# Patient Record
Sex: MALE | Race: WHITE | NOT HISPANIC OR LATINO | Employment: STUDENT | URBAN - METROPOLITAN AREA
[De-identification: names, ages, dates, MRNs, and addresses within clinical notes are randomized per-mention and may not be internally consistent; named-entity substitution may affect disease eponyms.]

---
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
## 2020-12-03 ENCOUNTER — OFFICE VISIT - HEALTHEAST (OUTPATIENT)
Dept: FAMILY MEDICINE | Facility: CLINIC | Age: 20
End: 2020-12-03

## 2020-12-03 ENCOUNTER — COMMUNICATION - HEALTHEAST (OUTPATIENT)
Dept: TELEHEALTH | Facility: CLINIC | Age: 20
End: 2020-12-03

## 2020-12-03 DIAGNOSIS — Z71.89 COUNSELING ON HEALTH CARE DIRECTIVE: ICD-10-CM

## 2020-12-03 DIAGNOSIS — Z00.00 ANNUAL PHYSICAL EXAM: ICD-10-CM

## 2020-12-03 DIAGNOSIS — K40.90 UNILATERAL INGUINAL HERNIA WITHOUT OBSTRUCTION OR GANGRENE, RECURRENCE NOT SPECIFIED: ICD-10-CM

## 2020-12-03 ASSESSMENT — MIFFLIN-ST. JEOR: SCORE: 2071.63

## 2021-01-04 ENCOUNTER — COMMUNICATION - HEALTHEAST (OUTPATIENT)
Dept: SURGERY | Facility: CLINIC | Age: 21
End: 2021-01-04

## 2021-01-04 ENCOUNTER — SURGERY - HEALTHEAST (OUTPATIENT)
Dept: SURGERY | Facility: CLINIC | Age: 21
End: 2021-01-04

## 2021-01-04 ENCOUNTER — OFFICE VISIT - HEALTHEAST (OUTPATIENT)
Dept: SURGERY | Facility: CLINIC | Age: 21
End: 2021-01-04

## 2021-01-04 DIAGNOSIS — K40.90 UNILATERAL INGUINAL HERNIA WITHOUT OBSTRUCTION OR GANGRENE, RECURRENCE NOT SPECIFIED: ICD-10-CM

## 2021-01-04 DIAGNOSIS — K40.91 UNILATERAL RECURRENT INGUINAL HERNIA WITHOUT OBSTRUCTION OR GANGRENE: ICD-10-CM

## 2021-01-04 ASSESSMENT — MIFFLIN-ST. JEOR: SCORE: 2058.02

## 2021-01-06 ENCOUNTER — AMBULATORY - HEALTHEAST (OUTPATIENT)
Dept: SURGERY | Facility: AMBULATORY SURGERY CENTER | Age: 21
End: 2021-01-06

## 2021-01-06 DIAGNOSIS — Z11.59 ENCOUNTER FOR SCREENING FOR OTHER VIRAL DISEASES: ICD-10-CM

## 2021-01-08 ASSESSMENT — MIFFLIN-ST. JEOR: SCORE: 2054.05

## 2021-01-09 ENCOUNTER — AMBULATORY - HEALTHEAST (OUTPATIENT)
Dept: FAMILY MEDICINE | Facility: CLINIC | Age: 21
End: 2021-01-09

## 2021-01-09 DIAGNOSIS — Z11.59 ENCOUNTER FOR SCREENING FOR OTHER VIRAL DISEASES: ICD-10-CM

## 2021-01-10 ENCOUNTER — COMMUNICATION - HEALTHEAST (OUTPATIENT)
Dept: FAMILY MEDICINE | Facility: CLINIC | Age: 21
End: 2021-01-10

## 2021-01-10 LAB
SARS-COV-2 PCR COMMENT: ABNORMAL
SARS-COV-2 RNA SPEC QL NAA+PROBE: POSITIVE
SARS-COV-2 VIRUS SPECIMEN SOURCE: ABNORMAL

## 2021-01-11 ENCOUNTER — COMMUNICATION - HEALTHEAST (OUTPATIENT)
Dept: SURGERY | Facility: CLINIC | Age: 21
End: 2021-01-11

## 2021-01-11 ENCOUNTER — ANESTHESIA - HEALTHEAST (OUTPATIENT)
Dept: SURGERY | Facility: AMBULATORY SURGERY CENTER | Age: 21
End: 2021-01-11

## 2021-01-13 ENCOUNTER — COMMUNICATION - HEALTHEAST (OUTPATIENT)
Dept: SURGERY | Facility: CLINIC | Age: 21
End: 2021-01-13

## 2021-01-13 ENCOUNTER — COMMUNICATION - HEALTHEAST (OUTPATIENT)
Dept: ADMINISTRATIVE | Facility: CLINIC | Age: 21
End: 2021-01-13

## 2021-01-25 ASSESSMENT — MIFFLIN-ST. JEOR: SCORE: 2054.05

## 2021-01-26 ENCOUNTER — AMBULATORY - HEALTHEAST (OUTPATIENT)
Dept: SURGERY | Facility: CLINIC | Age: 21
End: 2021-01-26

## 2021-01-26 ENCOUNTER — SURGERY - HEALTHEAST (OUTPATIENT)
Dept: SURGERY | Facility: AMBULATORY SURGERY CENTER | Age: 21
End: 2021-01-26

## 2021-01-26 DIAGNOSIS — Z98.890 POSTOPERATIVE STATE: ICD-10-CM

## 2021-01-26 RX ORDER — DOCUSATE SODIUM 100 MG/1
100 CAPSULE, LIQUID FILLED ORAL 2 TIMES DAILY
Refills: 0 | Status: SHIPPED | COMMUNITY
Start: 2021-01-26 | End: 2022-03-11

## 2021-02-09 ENCOUNTER — OFFICE VISIT - HEALTHEAST (OUTPATIENT)
Dept: SURGERY | Facility: CLINIC | Age: 21
End: 2021-02-09

## 2021-02-09 DIAGNOSIS — Z48.89 POSTOPERATIVE VISIT: ICD-10-CM

## 2021-06-05 VITALS
BODY MASS INDEX: 25.94 KG/M2 | HEIGHT: 76 IN | DIASTOLIC BLOOD PRESSURE: 64 MMHG | OXYGEN SATURATION: 100 % | HEART RATE: 72 BPM | SYSTOLIC BLOOD PRESSURE: 116 MMHG | WEIGHT: 213 LBS

## 2021-06-05 VITALS
HEIGHT: 76 IN | WEIGHT: 210 LBS | DIASTOLIC BLOOD PRESSURE: 78 MMHG | SYSTOLIC BLOOD PRESSURE: 118 MMHG | BODY MASS INDEX: 25.57 KG/M2 | HEART RATE: 66 BPM

## 2021-06-05 VITALS — WEIGHT: 210 LBS | BODY MASS INDEX: 25.57 KG/M2 | HEIGHT: 76 IN

## 2021-06-13 NOTE — PROGRESS NOTES
Assessment:      Healthy male exam.      Plan:      1. Annual physical exam     2. Unilateral inguinal hernia without obstruction or gangrene, recurrence not specified  Ambulatory referral to General Surgery   3. Counseling on health care directive       Counseled on advanced healthcare directives.  Declines completing HPV vaccine series.  Bulge certainly looks like a hernia.  General surgery referral placed to discuss definitive treatment.    Subjective:      Hesham Malcolm is a 20 y.o. male who presents for an annual exam. The patient reports that there is not domestic violence in his life.     Overall patient is doing well.  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  He has noticed a lump in his pelvic/groin area particularly with exercise.  It is becoming more persistent and bothersome.  No urinary symptoms.  No GI issues.  No pain at rest.  Nothing like this is ever happened before.  Afebrile without chills.  No testicular pain.       Healthy Habits:   Regular Exercise: Yes  Sunscreen Use: Yes  Healthy Diet: Yes  Dental Visits Regularly: Yes  Seat Belt: Yes  Sexually active: No  Monthly Self Testicular Exams:  discussed  Hemoccults: N/A  Flex Sig: N/A  Colonoscopy: N/A  Lipid Profile: N/A  Glucose Screen: No  Prevention of Osteoporosis: N/A  Last Dexa: N/A      Immunization History   Administered Date(s) Administered     Dtap 2000, 2000, 2000, 06/13/2000, 08/04/2004     HPV Quadrivalent 07/16/2015     Hep B, Peds or Adolescent 1999, 2000, 1999, 1999     Hepatitis A, Ped/Adol 2 Dose IM (18yr & under) 07/16/2015     Hib (PRP-OMP) 1999, 1999, 1999     Hib (PRP-T) 06/13/2000     INFLUENZA,SEASONAL QUAD, PF, =/> 6months 12/03/2020     IPV 08/04/2004     MMR 06/13/2000, 08/04/2004     Meningococcal MCV4P 06/20/2011     OPV,Trivalent,Historic(3621-5748 only) 1999, 1999, 1999     Tdap  06/20/2011     Varicella 08/04/2004, 07/16/2015     Immunization status: Refuses Immunization.    No exam data present    No current outpatient medications on file.     No current facility-administered medications for this visit.      No past medical history on file.  Past Surgical History:   Procedure Laterality Date     SHOULDER ARTHROSCOPY W/ LABRAL REPAIR Left      WISDOM TOOTH EXTRACTION       Patient has no known allergies.  Family History   Problem Relation Age of Onset     Heart disease Father      No Medical Problems Sister      No Medical Problems Brother      No Medical Problems Sister      No Medical Problems Sister      Diabetes type I Sister      No Medical Problems Brother      Social History     Socioeconomic History     Marital status: Single     Spouse name: Not on file     Number of children: Not on file     Years of education: Not on file     Highest education level: Not on file   Occupational History     Not on file   Social Needs     Financial resource strain: Not on file     Food insecurity     Worry: Not on file     Inability: Not on file     Transportation needs     Medical: Not on file     Non-medical: Not on file   Tobacco Use     Smoking status: Never Smoker     Smokeless tobacco: Never Used   Substance and Sexual Activity     Alcohol use: Yes     Frequency: 2-4 times a month     Drug use: Never     Sexual activity: Not on file   Lifestyle     Physical activity     Days per week: Not on file     Minutes per session: Not on file     Stress: Not on file   Relationships     Social connections     Talks on phone: Not on file     Gets together: Not on file     Attends Taoism service: Not on file     Active member of club or organization: Not on file     Attends meetings of clubs or organizations: Not on file     Relationship status: Not on file     Intimate partner violence     Fear of current or ex partner: Not on file     Emotionally abused: Not on file     Physically abused: Not on file  "    Forced sexual activity: Not on file   Other Topics Concern     Not on file   Social History Narrative     Not on file       Review of Systems  General:  Denies problem  Eyes: Denies problem  Ears/Nose/Throat: Denies problem  Cardiovascular: Denies problem  Respiratory:  Denies problem  Gastrointestinal:  Bulge  Genitourinary: Denies problem  Musculoskeletal:  Denies problem  Skin: Denies problem  Neurologic: Denies problem  Psychiatric: Denies problem  Endocrine: Denies problem  Heme/Lymphatic: Denies problem   Allergic/Immunologic: Denies problem        Objective:     Vitals:    12/03/20 0853   BP: 116/64   Pulse: 72   SpO2: 100%   Weight: 213 lb (96.6 kg)   Height: 6' 4.25\" (1.937 m)     Body mass index is 25.76 kg/m .    Physical  General Appearance: Alert, cooperative, no distress, appears stated age  Head: Normocephalic, without obvious abnormality, atraumatic  Eyes: PERRL, conjunctiva/corneas clear, EOM's intact  Ears: Normal TM's and external ear canals, both ears  Nose: Nares normal, septum midline,mucosa normal, no drainage  Throat: Lips, mucosa, and tongue normal; teeth and gums normal  Neck: Supple, symmetrical, trachea midline, no adenopathy;  thyroid: not enlarged, symmetric, no tenderness/mass/nodules; no carotid bruit or JVD  Back: Symmetric, no curvature, ROM normal, no CVA tenderness  Lungs: Clear to auscultation bilaterally, respirations unlabored  Heart: Regular rate and rhythm, S1 and S2 normal, no murmur, rub, or gallop,  Abdomen: Soft, non-tender, bowel sounds active all four quadrants,  no masses, no organomegaly  Genitourinary: Penis normal. Right testis is descended. Left testis is descended.  Along the inguinal area there is a moderate sized bulge.  Reducible.   Musculoskeletal: Normal range of motion. No joint swelling or deformity.   Extremities: Extremities normal, atraumatic, no cyanosis or edema  Skin: Skin color, texture, turgor normal, no rashes or lesions  Lymph nodes: Cervical, " supraclavicular, and axillary nodes normal  Neurologic: He is alert. He has normal reflexes.   Psychiatric: He has a normal mood and affect.      Deon Ariza, CNP

## 2021-06-14 NOTE — TELEPHONE ENCOUNTER
Reason for Call: Lab Order for Anitbody testing    Order or referral being requested: Lab Order    Date needed: as soon as possible    Has the patient been seen by the PCP for this problem? YES and NO    Additional comments: Pt would like do to a covid antibiody lab test. Pt was tested positive on 1/9/2021 with [facility], he had another test done couple days later and that test came back negative that's why he wants to get this lab test done. Can you order lab for him?     Phone number Patient can be reached at:  Home number on file [phone] (home)    Best Time:  anytime    Can we leave a detailed message on this number?  Yes    Call taken on 1/13/2021 at 10:44 AM by Roxy White

## 2021-06-14 NOTE — ANESTHESIA CARE TRANSFER NOTE
Last vitals:   Vitals:    01/26/21 1014   BP: 126/56   Pulse: 76   Resp: 16   Temp: 36.3  C (97.4  F)   SpO2: 99%     Pt brought to PACU on 10L facemask. Monitors applied. VSS upon arrival.    Patient's level of consciousness is drowsy  Spontaneous respirations: yes  Maintains airway independently: yes  Dentition unchanged: yes  Oropharynx: oropharynx clear of all foreign objects    QCDR Measures:  ASA# 20 - Surgical Safety Checklist: WHO surgical safety checklist completed prior to induction    PQRS# 430 - Adult PONV Prevention: 4558F - Pt received => 2 anti-emetic agents (different classes) preop & intraop  ASA# 8 - Peds PONV Prevention: NA - Not pediatric patient, not GA or 2 or more risk factors NOT present  PQRS# 424 - Palak-op Temp Management: 4559F - At least one body temp DOCUMENTED => 35.5C or 95.9F within required timeframe  PQRS# 426 - PACU Transfer Protocol: - Transfer of care checklist used  ASA# 14 - Acute Post-op Pain: ASA14B - Patient did NOT experience pain >= 7 out of 10

## 2021-06-14 NOTE — TELEPHONE ENCOUNTER
Spoke with Hesham today to reschedule his surgery scheduled for tomorrow due to his positive covid test on 1/9/21. Hesham mentioned that he got another test done yesterday hoping that it is negative and asked if he could keep his surgery on if the second one was negative. Informed Hesham that unfortunately, we have to wait 10 days from a positive covid test. Also mentioned that he will not need to do another covid test within 90 days since it may show positive after a couple months. Hesham verbalized understanding and would like to reschedule to Dr. Peterson's next available date of jan. 29th 2021. New check in time is approximately 8:00 AM. Hesham is aware of surgery instructions.    Carmella RAMIREZ  Surgery Scheduler  [facility]  Surgery Clinic [facility]  Direct line: [phone]   Main Line: [phone]  Direct Fax: [phone]

## 2021-06-14 NOTE — ANESTHESIA PREPROCEDURE EVALUATION
Anesthesia Evaluation      Patient summary reviewed   History of anesthetic complications: ponv.     Airway   Mallampati: II   Pulmonary - normal exam     ROS comment: COVID positive 1/9/2021                         Cardiovascular - negative ROS and normal exam  Rhythm: regular  Rate: normal,         Neuro/Psych - negative ROS     Endo/Other - negative ROS      GI/Hepatic/Renal - negative ROS      Other findings: Results for CATHERINE VAN (MRN [id number]) as of 1/26/2021 06:34    1/9/2021 09:58  SARS-CoV-2 Virus Specimen Source: Nasopharyngeal  SARS-CoV-2 PCR Result: POSITIVE (!!)        Dental - normal exam                        Anesthesia Plan  Planned anesthetic: general endotracheal  GAETT  Antiemetics  Soft bite block  Tylenol po pre op  toradol at the end of the case if okay with the surgeon      ASA 1   Induction: intravenous   Anesthetic plan and risks discussed with: patient    Post-op plan: routine recovery

## 2021-06-14 NOTE — TELEPHONE ENCOUNTER
Spoke with Hesham today and went over surgery details:      Surgery Date: Tuesday January 12th     Location: [facility] [address] (3rd Floor)                  [address] [place] [address]     Arrival Time: 8:15 AM (unless instructed otherwise by the pre-op nurse)    Prep Instructions:     1. Dr. Peterson will do your pre-op the same day of surgery.    2. COVID19 testing is required within 4 days of surgery. [facility] [address] [place], [place] on Sat. Miguel 9th at 9:50 AM. Follow the specific instructions you receive by Rodger. If your test is positive, your surgery will be canceled.     3. Nothing to eat or drink for 8 hours before surgery unless instructed differently by the pre-op nurse.    4. No blood thinners including aspirin for one week prior to surgery. Verify this is safe for you with your primary care doctor before stopping.     5. You need an adult to drive you home and stay with you 24 hours after surgery.     6. No visitors are allowed at the facility or in the building. Family/Friends who accompany the patient will need to wait in their vehicle or return home to be called when patient is ready for .    7. When you arrive to the surgery center, you will be screened for COVID19 symptoms. If you screen positive, your surgery will need to be postponed for your safety.    8. If the community sees a new surge in COVID19 hospital admissions, your procedure may need to be postponed. We will contact you if this happens.    9. We always encourage you to notify your insurance any time you have something scheduled including surgery. The number is usually right on the back of your insurance card. Please call [facility] Cost of Care at [phone] for the surgeon fees, and [facility] Cost of Care [phone] for facility fees if you  need pricing information.     Call our office if you have any questions! Thank you!     Carmella RAMIREZ  Surgery Scheduler  [facility]  Surgery [facility]  Direct line: [phone]   Main Line: [phone]  Direct Fax: [phone]

## 2021-06-14 NOTE — ANESTHESIA POSTPROCEDURE EVALUATION
Patient: Hesham Malcolm  Procedure(s):  HERNIORRHAPHY, INGUINAL, LAPAROSCOPIC WITH MESH (Right)  Anesthesia type: general    Patient location: Phase II Recovery  Last vitals:   Vitals Value Taken Time   /56 01/26/21 1145   Temp 36.3  C (97.4  F) 01/26/21 1120   Pulse 67 01/26/21 1154   Resp 16 01/26/21 1145   SpO2 99 % 01/26/21 1154   Vitals shown include unvalidated device data.  Post vital signs: stable  Level of consciousness: awake and responds to simple questions  Post-anesthesia pain: pain controlled  Post-anesthesia nausea and vomiting: no  Pulmonary: unassisted, return to baseline  Cardiovascular: stable and blood pressure at baseline  Hydration: adequate  Anesthetic events: no    QCDR Measures:  ASA# 11 - Palak-op Cardiac Arrest: ASA11B - Patient did NOT experience unanticipated cardiac arrest  ASA# 12 - Palak-op Mortality Rate: ASA12B - Patient did NOT die  ASA# 13 - PACU Re-Intubation Rate: ASA13B - Patient did NOT require a new airway mgmt  ASA# 10 - Composite Anes Safety: ASA10A - No serious adverse event    Additional Notes:

## 2021-06-14 NOTE — PROGRESS NOTES
"HPI:  Hesham Malcolm is a 20 y.o. male who was referred to me by Provider, [facility] for an inguinal hernia. He  presents today with complaints of a right inguinal bulge for several months.  He notices the bulge is worse after doing pull-ups.  He is fairly active and states that is progressively enlarging and causing discomfort with his activities.  Denies any fevers, chills, nausea or vomiting.    Allergies:Patient has no known allergies.    No past medical history on file.    Past Surgical History:   Procedure Laterality Date     SHOULDER ARTHROSCOPY W/ LABRAL REPAIR Left      WISDOM TOOTH EXTRACTION         CURRENT MEDS:  No current outpatient medications on file.     No current facility-administered medications for this visit.        Family history-reviewed and noncontributory to the current visit     reports that he has never smoked. He has never used smokeless tobacco. He reports current alcohol use. He reports that he does not use drugs.    Review of Systems -   The 12 system review is within normal limits except for as mentioned above.  General ROS: No complaints or constitutional symptoms  Ophthalmic ROS: No complaints of visual changes  Skin: No complaints or symptoms   Endocrine: No complaints or symptoms  Hematologic/Lymphatic: No symptoms or complaints  Psychiatric: No symptoms or complaints  Respiratory ROS: no cough, shortness of breath, or wheezing  Cardiovascular ROS: no chest pain or dyspnea on exertion  Gastrointestinal ROS: As per HPI  Genito-Urinary ROS: no dysuria, trouble voiding, or hematuria  Musculoskeletal ROS: no joint or muscle pain  Neurological ROS: no TIA or stroke symptoms    /78   Pulse 66   Ht 6' 4.25\" (1.937 m)   Wt 210 lb (95.3 kg)   BMI 25.39 kg/m    Body mass index is 25.39 kg/m .    EXAM:  GENERAL: Well developed male, No acute distress, pleasant and conversant   EYES: Pupils equal, round and reactive, no scleral icterus  CARDIAC: Regular rate and rhythm, no " obvious murmurs noted  CHEST/LUNG: Clear to ascultation bilaterally, No ronchi, No wheezes  ABDOMEN: Right inguinal hernia reducible, no evidence of a left inguinal hernia  SKIN: Pink, warm and dry, no obvious rashes or lesions   NEURO:No focal deficits, ambulatory  MUSCULOSKELETAL:No obvious deformities, no swelling, normal appearing          Assessment/Plan:   Hesham Malcolm is a 20 y.o. male with a right inguinal hernia.  I have discussed the pathophysiology of inguinal hernias at length as well as the  surgical and non-operative management strategies.      The risks of Robotic, Laparoscopic and open inguinal hernia  surgery were explained in detail which include, but are not limited to, bleeding, infection of the mesh, recurrence of the hernia, chronic pain, poor cosmesis, the need for reoperative intervention, the possibility of conversion from a laparoscopic approach to an open approach, subcutaneous emphysema, injury to vital structures,  blood clots, heart attack, stroke and death.  Additionally, the risks of observation were also discussed in detail which include, but are not limited to, chronic pain, enlargement of the hernia, incarceration, strangulation and death.      He understands everything that was discussed and has consented to proceed with surgery.   We will plan on scheduling a laparoscopic right inguinal hernia repair at his desired date.       Quintin Peterson D.O. FACS  [phone]  [facility] Department of Surgery

## 2021-06-14 NOTE — TELEPHONE ENCOUNTER
"  Coronavirus (COVID-19) Notification    Caller Name (Patient, parent, daughter/son, grandparent, etc)  patient    Reason for call  Notify of Positive Coronavirus (COVID-19) lab results, assess symptoms,  review [facility] recommendations    Lab Result    Lab test:  2019-nCoV rRt-PCR or SARS-CoV-2 PCR    Oropharyngeal AND/OR nasopharyngeal swabs is POSITIVE for 2019-nCoV RNA/SARS-COV-2 PCR (COVID-19 virus)    RN Recommendations/Instructions per [facility] Coronavirus COVID-19 recommendations    Brief introduction script  Introduce self and then review script:  \"I am calling on behalf of Quaam.  We were notified that your Coronavirus test (COVID-19) for was POSITIVE for the virus.  I have some information to relay to you but first I wanted to mention that the [place] Dept of Health will be contacting you shortly [it's possible MD already called Patient] to talk to you more about how you are feeling and other people you have had contact with who might now also have the virus.  Also, [facility] is Partnering with the [facility] for Covid-19 research, you may be contacted directly by research staff.\"    Assessment (Inquire about Patient's current symptoms)   Assessment   Current Symptoms at time of phone call: (if no symptoms, document No symptoms] No symptoms   Symptom onset (if applicable) n/a     If at time of call, Patients symptoms hare worsened, the Patient should contact 911 or have someone drive them to Emergency Dept promptly:      If Patient calling 911, inform 911 personal that you have tested positive for the Coronavirus (COVID-19).  Place mask on and await 911 to arrive.    If Emergency Dept, If possible, please have another adult drive you to the Emergency Dept but you need to wear mask when in contact with other people.        Monoclonal Antibody Administration    You may be eligible to receive a new treatment with a monoclonal antibody for preventing hospitalization in " "patients at high risk for complications from COVID-19.   This medication is still experimental and available on a limited basis; it is given through an IV and must be given at an infusion center. Please note that not all people who are eligible will receive the medication since it is in limited supply.     Are you interested in being considered for this medication?  No.  Does the patient fit the criteria: Patient declined    If patient qualifies based on above criteria:  \"We will contact you if you are selected to receive the medication in the next 1-2 days.   This is time sensitive and if you are not selected in the next 1-2 days, you will not receive the medication.  If you do not receive a call to schedule, you have not been selected.\"    Review information with Patient    Your result was positive. This means you have COVID-19 (coronavirus).  We have sent you a letter that reviews the information that I'll be reviewing with you now.    How can I protect others?    If you have symptoms: stay home and away from others (self-isolate) until:    You've had no fever--and no medicine that reduces fever--for 1 full day (24 hours). And      Your other symptoms have gotten better. For example, your cough or breathing has improved. And     At least 10 days have passed since your symptoms started. (If you ve been told by a doctor that you have a weak immune system, wait 20 days.)     If you don't have symptoms: Stay home and away from others (self-isolate) until at least 10 days have passed since your first positive COVID-19 test. (Date test collected).    During this time:    Stay in your own room, including for meals. Use your own bathroom if you can.    Stay away from others in your home. No hugging, kissing or shaking hands. No visitors.     Don't go to work, school or anywhere else.     Clean  high touch  surfaces often (doorknobs, counters, handles, etc.). Use a household cleaning spray or wipes. You'll find a full " list on the EPA website at www.epa.gov/pesticide-registration/list-n-disinfectants-use-against-sars-cov-2.     Cover your mouth and nose with a mask, tissue or other face covering to avoid spreading germs.    Wash your hands and face often with soap and water.    Caregivers in these groups are at risk for severe illness due to COVID-19:  o People 65 years and older  o People who live in a nursing home or long-term care facility  o People with chronic disease (lung, heart, cancer, diabetes, kidney, liver, immunologic)  o People who have a weakened immune system, including those who:  - Are in cancer treatment  - Take medicine that weakens the immune system, such as corticosteroids  - Had a bone marrow or organ transplant  - Have an immune deficiency  - Have poorly controlled HIV or AIDS  - Are obese (body mass index of 40 or higher)  - Smoke regularly    Caregivers should wear gloves while washing dishes, handling laundry and cleaning bedrooms and bathrooms.    Wash and dry laundry with special caution. Don't shake dirty laundry, and use the warmest water setting you can.    If you have a weakened immune system, ask your doctor about other actions you should take.    For more tips, go to www.cdc.gov/coronavirus/2019-ncov/downloads/10Things.pdf.    You should not go back to work until you meet the guidelines above for ending your home isolation. You don't need to be retested for COVID-19 before going back to work--studies show that you won't spread the virus if it's been at least 10 days since your symptoms started (or 20 days, if you have a weak immune system).    Employers: This document serves as formal notice of your employee's medical guidelines for going back to work. They must meet the above guidelines before going back to work in person.    How can I take care of myself?    1. Get lots of rest. Drink extra fluids (unless a doctor has told you not to).    2. Take Tylenol (acetaminophen) for fever or pain. If  you have liver or kidney problems, ask your family doctor if it's okay to take Tylenol.     Take either:     650 mg (two 325 mg pills) every 4 to 6 hours, or     1,000 mg (two 500 mg pills) every 8 hours as needed.     Note: Don't take more than 3,000 mg in one day. Acetaminophen is found in many medicines (both prescribed and over-the-counter medicines). Read all labels to be sure you don't take too much.    For children, check the Tylenol bottle for the right dose (based on their age or weight).    3. If you have other health problems (like cancer, heart failure, an organ transplant or severe kidney disease): Call your specialty clinic if you don't feel better in the next 2 days.    4. Know when to call 911: Emergency warning signs include:    Trouble breathing or shortness of breath    Pain or pressure in the chest that doesn't go away    Feeling confused like you haven't felt before, or not being able to wake up    Bluish-colored lips or face    5. Sign up for [employer]. We know it's scary to hear that you have COVID-19. We want to track your symptoms to make sure you're okay over the next 2 weeks. Please look for an email from [employer]--this is a free, online program that we'll use to keep in touch. To sign up, follow the link in the email. Learn more at [email or web address].    Where can I get more information?    [facility]: [email or web address]    Coronavirus Basics: www.health.[facility].[place]/diseases/coronavirus/basics.html    What to Do If You're Sick: www.cdc.gov/coronavirus/2019-ncov/about/steps-when-sick.html    Ending Home Isolation: www.cdc.gov/coronavirus/2019-ncov/hcp/disposition-in-home-patients.html     Caring for Someone with COVID-19: www.cdc.gov/coronavirus/2019-ncov/if-you-are-sick/care-for-someone.html     [facility] clinical trials (COVID-19 research studies): clinicalaffairs.[facility]/umn-clinical-trials     A Positive COVID-19 letter will be sent via  [employer] or the Mail.  (Exception, no letters sent to Presurgerical/Preprocedure Patients)    Addie Real LPN

## 2021-06-14 NOTE — TELEPHONE ENCOUNTER
Spoke with Hesham. He would like a quote for surgery before scheduling. Provided [facility]'s cost of care estimate phone number. [phone]. Hesham will call when he is ready to schedule.    Carmella RAMIREZ  Surgery Scheduler  [facility]  Surgery [facility]  Direct line: [phone]   Main Line: [phone]  Direct Fax: [phone]

## 2021-06-14 NOTE — PATIENT INSTRUCTIONS - HE
Hernia education & surgery packet provided to pt.      ELIEZER Carpenter [facility]  P: [phone] I F: [phone]

## 2021-06-15 NOTE — PROGRESS NOTES
"      The patient has been notified of following:     \"This telephone visit will be conducted via a call between you and your physician/provider. We have found that certain health care needs can be provided without the need for a physical exam.  This service lets us provide the care you need with a short phone conversation.  If a prescription is necessary we can send it directly to your pharmacy.  If lab work is needed we can place an order for that and you can then stop by our lab to have the test done at a later time.    Telephone visits are billed at different rates depending on your insurance coverage. During this emergency period, for some insurers they may be billed the same as an in-person visit.  Please reach out to your insurance provider with any questions.    If during the course of the call the physician/provider feels a telephone visit is not appropriate, you will not be charged for this service.\"    Patient has given verbal consent to a Telephone visit? Yes    What phone number would you like to be contacted at? home    Patient would like to receive their AVS by AVS Preference: Rodger.   HPI: 21-year-old male underwent laparoscopic right inguinal hernia repair.  Right inguinal hernia repair by Dr Peterson on 1/26/2021.  Overall he feels like he is improving.  He does have a little bit of swelling that is soft into the scrotum and occasionally has sharp pulling pains of his testicle.  Has lumps underneath his incisions.  No redness or swelling or drainage from the incisions.  No fevers.  Eating and drinking well.      There were no vitals taken for this visit.    EXAM:  N/A    Assessment/Plan: . Doing well after surgery and should follow up as needed.  Discussed postoperative pain and swelling which is normal.  If increased pain or swelling to let us know.  Cora Unger PA-C  [facility] Department of Surgery    "

## 2021-06-16 PROBLEM — K40.90 UNILATERAL INGUINAL HERNIA WITHOUT OBSTRUCTION OR GANGRENE, RECURRENCE NOT SPECIFIED: Status: ACTIVE | Noted: 2021-01-06

## 2021-06-18 NOTE — PATIENT INSTRUCTIONS - HE
Patient Instructions by Deon Ariza CNP at 12/3/2020  9:00 AM     Author: Deon Ariza CNP Service: -- Author Type: Nurse Practitioner    Filed: 12/3/2020  9:16 AM Encounter Date: 12/3/2020 Status: Signed    : Deon Ariza CNP (Nurse Practitioner)       Flu shot good for the season.    General surgery should be calling you. Let me know if you don't hear anything in a week.    Patient Education     Hernia (Adult)    A hernia can happen when there is a weakness or defect in the wall of the abdomen or groin. Intestines or nearby tissues may move from their usual location and push through the weakness in the wall. This can cause a hernia (bulge) you may see or feel.  Causes and risk factors   A hernia may be present at birth. Or it may be caused by the wear and tear of daily living. Certain factors can make a hernia more likely. These can include:    Heavy lifting    Straining, whether from lifting, movement, or constipation    Chronic cough    Injury to the abdominal wall    Excess weight    Pregnancy    Prior surgery    Older age    Family history of hernia  Symptoms  Symptoms of a hernia may come on suddenly. Or they may appear slowly over time. Some common symptoms include:    Bulge in the groin area, around the navel, or in the scrotum (the bulge may get bigger when you stand and go away when you lie down)    Pain or pressure around the bulge    Pain during activities such as lifting, coughing, or sneezing    A feeling of weakness or pressure in the groin    Pain or swelling in the scrotum  Types of hernias  There are different types of hernia. The type you have depends on its location:    Inguinal. This type is in the groin or scrotum. It is more common in men.    Femoral. This type is in the groin, upper thigh (where the leg bends), or labia. It is more common in women.    Ventral. This type is in the abdominal wall.    Umbilical. This type occurs around the navel (belly button).    Incisional.  This type occurs at the site of a previous surgery.  The condition of the hernia can help determine how urgently it needs to be treated.    Reducible. It goes back in by itself, or it can be pushed back in.    Irreducible. It cant be pushed back in.    Incarcerated/strangulated. The intestine is trapped (incarcerated). If this happens, you wont be able to push the bulge back in. If the incarcerated hernia isnt treated, it may become strangulated. This means the area loses blood supply and the tissue may die. This requires emergency surgery. You need treatment right away.  In most cases, a hernia will not heal on its own. Surgery is usually needed to repair the defect in the abdominal wall or groin. Youll be told more about surgery, if needed.  If your symptoms are not severe, treatment may sometimes be delayed. In such cases, regular follow-up visits with the provider will be needed. Youll be asked to keep track of your symptoms and to watch for signs of more serious problems. You may also be given guidelines similar to the home care instructions below.  Home care  To help keep a hernia from getting worse, you may be advised to:    Avoid heavy lifting and straining as directed.    Take steps to prevent constipation, such as eating more fiber and drinking more water. This may help reduce straining that can occur when having a bowel movement. Reducing straining may help keep your symptoms from getting worse.    Maintain a healthy weight or lose excess weight. This can help reduce strain on abdominal muscles and tissues.    Stop smoking. This can help prevent coughing that may also strain abdominal muscles and tissues.  Follow-up care  Follow up with your healthcare provider, or as directed. If imaging tests were done, they will be reviewed a doctor. You will be told the results and any new findings that may affect your care.  When to seek medical advice  Call your healthcare provider right away if any of these  occur:    Hernia hardens, swells, or grows larger    Hernia can no longer be pushed back in    Pain moves to the lower right abdomen (just below the waistline), or spreads to the back  Call 911  Call 911 if any of these occur:    Nausea and vomiting    Severe pain, redness, or tenderness in the area near the hernia    Pain worsens quickly and doesnt get better    Inability to have a bowel movement or pass gas    Fever of 100.4 F (38 C) or higher, or as directed by your healthcare provider    Trouble breathing    Fainting    Rapid heart rate    Vomiting blood    Large amounts of blood in stool  Date Last Reviewed: 6/9/2015 2000-2017 [employer]. [address] [place] [address], [place] [address]. All rights reserved. This information is not intended as a substitute for professional medical care. Always follow your healthcare professional's instructions.

## 2021-07-02 ENCOUNTER — TRANSFERRED RECORDS (OUTPATIENT)
Dept: HEALTH INFORMATION MANAGEMENT | Facility: CLINIC | Age: 21
End: 2021-07-02
Payer: COMMERCIAL

## 2021-10-09 ENCOUNTER — HEALTH MAINTENANCE LETTER (OUTPATIENT)
Age: 21
End: 2021-10-09

## 2021-11-22 ENCOUNTER — OFFICE VISIT (OUTPATIENT)
Dept: FAMILY MEDICINE | Facility: CLINIC | Age: 21
End: 2021-11-22
Payer: COMMERCIAL

## 2021-11-22 VITALS
TEMPERATURE: 98.3 F | WEIGHT: 203.4 LBS | DIASTOLIC BLOOD PRESSURE: 62 MMHG | BODY MASS INDEX: 24.76 KG/M2 | HEART RATE: 84 BPM | SYSTOLIC BLOOD PRESSURE: 126 MMHG

## 2021-11-22 DIAGNOSIS — K12.30 STOMATITIS AND MUCOSITIS: Primary | ICD-10-CM

## 2021-11-22 DIAGNOSIS — K12.1 STOMATITIS AND MUCOSITIS: Primary | ICD-10-CM

## 2021-11-22 PROCEDURE — 99213 OFFICE O/P EST LOW 20 MIN: CPT | Performed by: FAMILY MEDICINE

## 2021-11-22 RX ORDER — DEXAMETHASONE 2 MG/1
10 TABLET ORAL
Qty: 15 TABLET | Refills: 0 | Status: SHIPPED | OUTPATIENT
Start: 2021-11-22 | End: 2022-03-11

## 2021-11-22 NOTE — PROGRESS NOTES
ASSESSMENT/PLAN:  Hesham was seen today for blisters.    Diagnoses and all orders for this visit:    Stomatitis and mucositis  -     dexamethasone (DECADRON) 2 MG tablet; Take 5 tablets (10 mg) by mouth daily (with breakfast)  We will give him dexamethasone for the pain and swelling.  He is to take Tylenol.  Advised soft diet.  Stay hydrated.  Call if he is not better in the next 2 to 3 days.      SUBJECTIVE:    Hesham Malcolm is a 21 year old male who came in today     The patient was in his normal state of health when he developed sores inside his mouth about 5 days ago.  These are sores on the inside of his lips, gums, cheeks, and throat.  Did not notice pain until about 3 days ago, this includes sore throat.  He has noted swollen lips.  Recently he has noted crusted material around his eyes  Denies fever, chills, myalgia.  No rash anywhere else.  No exposure to Covid, strep, or anyone who has been sick.    Review of Systems (except those mentioned above)  Constitutional: Negative.   HENT: Negative.   Eyes: Negative.   Respiratory: Negative.   Cardiovascular: Negative.   Gastrointestinal: Negative.   Endocrine: Negative.   Genitourinary: Negative.   Musculoskeletal: Negative.   Skin: Negative.   Allergic/Immunologic: Negative.   Neurological: Negative.   Hematological: Negative.   Psychiatric/Behavioral: Negative.     Patient Active Problem List    Diagnosis Date Noted     Unilateral inguinal hernia without obstruction or gangrene, recurrence not specified      Priority: Medium     No Known Allergies  Current Outpatient Medications   Medication Sig Dispense Refill     dexamethasone (DECADRON) 2 MG tablet Take 5 tablets (10 mg) by mouth daily (with breakfast) 15 tablet 0     docusate sodium (COLACE) 100 MG capsule [DOCUSATE SODIUM (COLACE) 100 MG CAPSULE] Take 1 capsule (100 mg total) by mouth 2 (two) times a day. (Patient not taking: Reported on 11/22/2021)  0     traMADoL (ULTRAM) 50 mg tablet [TRAMADOL  (ULTRAM) 50 MG TABLET] Take 1 tablet (50 mg total) by mouth every 6 (six) hours as needed for pain. (Patient not taking: Reported on 11/22/2021) 10 tablet 0     Past Medical History:   Diagnosis Date     PONV (postoperative nausea and vomiting)      Past Surgical History:   Procedure Laterality Date     GA LAP,INGUINAL HERNIA REPR,INITIAL Right 1/26/2021    Procedure: HERNIORRHAPHY, INGUINAL, LAPAROSCOPIC WITH MESH;  Surgeon: Prateek Peterson DO;  [facility];  Service: General     SHOULDER ARTHROSCOPY W/ LABRAL REPAIR Left      SHOULDER SURGERY       WISDOM TOOTH EXTRACTION       Social History     Socioeconomic History     Marital status: Single     Spouse name: Not on file     Number of children: Not on file     Years of education: Not on file     Highest education level: Not on file   Occupational History     Not on file   Tobacco Use     Smoking status: Never Smoker     Smokeless tobacco: Never Used   Substance and Sexual Activity     Alcohol use: Yes     Drug use: Not Currently     Sexual activity: Not on file   Other Topics Concern     Not on file   Social History Narrative    ** Merged History Encounter **       Social Determinants of Health     Financial Resource Strain: Not on file   Food Insecurity: Not on file   Transportation Needs: Not on file   Physical Activity: Not on file   Stress: Not on file   Social Connections: Not on file   Intimate Partner Violence: Not on file   Housing Stability: Not on file     Family History   Problem Relation Age of Onset     Heart Disease Father      No Known Problems Sister      No Known Problems Brother      No Known Problems Sister      No Known Problems Sister      Diabetes Type 1 Sister      No Known Problems Brother          OBJECTIVE:    Vitals:    11/22/21 0948   BP: 126/62   Pulse: 84   Temp: 98.3  F (36.8  C)   TempSrc: Oral   Weight: 92.3 kg (203 lb 6.4 oz)     Body mass index is 24.76 kg/m .    Physical Exam:  Constitutional: Patient is  oriented to person, place, and time. Patient appears well-developed and well-nourished. No distress.   Head: Normocephalic and atraumatic.   Right Ear: External ear normal.  Normal tympanic membrane  Left Ear: External ear normal. Normal tympanic membrane  Eyes: Conjunctivae and EOM are normal. Right eye exhibits no discharge. Left eye exhibits no discharge. No scleral icterus.   Mouth: Aphthous ulcers on the inner side of his lips, gums, inner cheeks, and peritonsillar area  Skin: No rash noted. Patient is not diaphoretic. No erythema. No pallor.

## 2022-01-29 ENCOUNTER — HEALTH MAINTENANCE LETTER (OUTPATIENT)
Age: 22
End: 2022-01-29

## 2022-03-11 ENCOUNTER — OFFICE VISIT (OUTPATIENT)
Dept: FAMILY MEDICINE | Facility: CLINIC | Age: 22
End: 2022-03-11
Payer: COMMERCIAL

## 2022-03-11 VITALS
HEIGHT: 75 IN | RESPIRATION RATE: 18 BRPM | BODY MASS INDEX: 25.36 KG/M2 | DIASTOLIC BLOOD PRESSURE: 68 MMHG | HEART RATE: 83 BPM | OXYGEN SATURATION: 99 % | TEMPERATURE: 97.8 F | SYSTOLIC BLOOD PRESSURE: 120 MMHG | WEIGHT: 204 LBS

## 2022-03-11 DIAGNOSIS — Z00.00 ROUTINE GENERAL MEDICAL EXAMINATION AT A HEALTH CARE FACILITY: Primary | ICD-10-CM

## 2022-03-11 DIAGNOSIS — M54.42 CHRONIC BILATERAL LOW BACK PAIN WITH BILATERAL SCIATICA: ICD-10-CM

## 2022-03-11 DIAGNOSIS — M54.41 CHRONIC BILATERAL LOW BACK PAIN WITH BILATERAL SCIATICA: ICD-10-CM

## 2022-03-11 DIAGNOSIS — G89.29 CHRONIC BILATERAL LOW BACK PAIN WITH BILATERAL SCIATICA: ICD-10-CM

## 2022-03-11 LAB
ALBUMIN SERPL-MCNC: 4.5 G/DL (ref 3.5–5)
ALP SERPL-CCNC: 71 U/L (ref 45–120)
ALT SERPL W P-5'-P-CCNC: 16 U/L (ref 0–45)
ANION GAP SERPL CALCULATED.3IONS-SCNC: 13 MMOL/L (ref 5–18)
AST SERPL W P-5'-P-CCNC: 16 U/L (ref 0–40)
BILIRUB SERPL-MCNC: 1.9 MG/DL (ref 0–1)
BUN SERPL-MCNC: 12 MG/DL (ref 8–22)
CALCIUM SERPL-MCNC: 9.6 MG/DL (ref 8.5–10.5)
CHLORIDE BLD-SCNC: 101 MMOL/L (ref 98–107)
CHOLEST SERPL-MCNC: 153 MG/DL
CO2 SERPL-SCNC: 27 MMOL/L (ref 22–31)
CREAT SERPL-MCNC: 1.06 MG/DL (ref 0.7–1.3)
FASTING STATUS PATIENT QL REPORTED: NORMAL
GFR SERPL CREATININE-BSD FRML MDRD: >90 ML/MIN/1.73M2
GLUCOSE BLD-MCNC: 93 MG/DL (ref 70–125)
HDLC SERPL-MCNC: 61 MG/DL
HIV 1+2 AB+HIV1 P24 AG SERPL QL IA: NEGATIVE
LDLC SERPL CALC-MCNC: 78 MG/DL
POTASSIUM BLD-SCNC: 4.5 MMOL/L (ref 3.5–5)
PROT SERPL-MCNC: 7.4 G/DL (ref 6–8)
SODIUM SERPL-SCNC: 141 MMOL/L (ref 136–145)
TRIGL SERPL-MCNC: 72 MG/DL

## 2022-03-11 PROCEDURE — 86803 HEPATITIS C AB TEST: CPT | Performed by: FAMILY MEDICINE

## 2022-03-11 PROCEDURE — 99213 OFFICE O/P EST LOW 20 MIN: CPT | Mod: 25 | Performed by: FAMILY MEDICINE

## 2022-03-11 PROCEDURE — 80053 COMPREHEN METABOLIC PANEL: CPT | Performed by: FAMILY MEDICINE

## 2022-03-11 PROCEDURE — 36415 COLL VENOUS BLD VENIPUNCTURE: CPT | Performed by: FAMILY MEDICINE

## 2022-03-11 PROCEDURE — 87389 HIV-1 AG W/HIV-1&-2 AB AG IA: CPT | Performed by: FAMILY MEDICINE

## 2022-03-11 PROCEDURE — 80061 LIPID PANEL: CPT | Performed by: FAMILY MEDICINE

## 2022-03-11 PROCEDURE — 99395 PREV VISIT EST AGE 18-39: CPT | Performed by: FAMILY MEDICINE

## 2022-03-11 NOTE — PROGRESS NOTES
SUBJECTIVE:   CC: Hesham Malcolm is an 22 year old male who presents for preventative health visit.     Patient has been advised of split billing requirements and indicates understanding: Yes  Healthy Habits:     Getting at least 3 servings of Calcium per day:  Yes    Bi-annual eye exam:  Yes    Dental care twice a year:  Yes    Sleep apnea or symptoms of sleep apnea:  None    Diet:  Regular (no restrictions)    Frequency of exercise:  4-5 days/week    Duration of exercise:  45-60 minutes    Taking medications regularly:  Yes    Medication side effects:  Not applicable    PHQ-2 Total Score: 0    Additional concerns today:  Yes      Low back pain x one year. Bilateral low back and position with bend. Sometimes shooting and tingling radiate to legs bilateral. No treatment and imagine. No fever, weakness. He has good bladder and bm control.   One year ago he was at GYM and squat and bend and noticed of low back pain, the next several days pain got worse with tingling to legs. Since then the pain comes and goes at same location. No heavy lifting         Today's PHQ-2 Score:   PHQ-2 ( 1999 Pfizer) 3/11/2022   Q1: Little interest or pleasure in doing things 0   Q2: Feeling down, depressed or hopeless 0   PHQ-2 Score 0   Q1: Little interest or pleasure in doing things Not at all   Q2: Feeling down, depressed or hopeless Not at all   PHQ-2 Score 0       Abuse: Current or Past(Physical, Sexual or Emotional)- No  Do you feel safe in your environment? Yes        Social History     Tobacco Use     Smoking status: Never Smoker     Smokeless tobacco: Never Used   Substance Use Topics     Alcohol use: Yes     If you drink alcohol do you typically have >3 drinks per day or >7 drinks per week? No    Alcohol Use 3/11/2022   Prescreen: >3 drinks/day or >7 drinks/week? No       Last PSA: No results found for: PSA    Reviewed orders with patient. Reviewed health maintenance and updated orders accordingly - Yes  Lab work is in  "process  Labs reviewed in EPIC    Reviewed and updated as needed this visit by clinical staff   Tobacco  Allergies  Meds   Med Hx  Surg Hx  Fam Hx  Soc Hx        Reviewed and updated as needed this visit by Provider                 Past Medical History:   Diagnosis Date     PONV (postoperative nausea and vomiting)       Past Surgical History:   Procedure Laterality Date     [place] LAP,INGUINAL HERNIA REPR,INITIAL Right 1/26/2021    Procedure: HERNIORRHAPHY, INGUINAL, LAPAROSCOPIC WITH MESH;  Surgeon: Prateek Peterson DO;  Location: [facility];  Service: General     SHOULDER ARTHROSCOPY W/ LABRAL REPAIR Left      SHOULDER SURGERY       WISDOM TOOTH EXTRACTION         Review of Systems  CONSTITUTIONAL: NEGATIVE for fever, chills, change in weight  INTEGUMENTARY/SKIN: NEGATIVE for worrisome rashes, moles or lesions  EYES: NEGATIVE for vision changes or irritation  ENT: NEGATIVE for ear, mouth and throat problems  RESP: NEGATIVE for significant cough or SOB  CV: NEGATIVE for chest pain, palpitations or peripheral edema  GI: NEGATIVE for nausea, abdominal pain, heartburn, or change in bowel habits   male: negative for dysuria, hematuria, decreased urinary stream, erectile dysfunction, urethral discharge  MUSCULOSKELETAL: NEGATIVE for significant arthralgias or myalgia  NEURO: NEGATIVE for weakness, dizziness or paresthesias  PSYCHIATRIC: NEGATIVE for changes in mood or affect    OBJECTIVE:   /68 (BP Location: Right arm, Patient Position: Sitting, Cuff Size: Adult Regular)   Pulse 83   Temp 97.8  F (36.6  C) (Oral)   Resp 18   Ht 1.91 m (6' 3.2\")   Wt 92.5 kg (204 lb)   SpO2 99%   BMI 25.36 kg/m      Physical Exam  GENERAL: healthy, alert and no distress  EYES: Eyes grossly normal to inspection, PERRL and conjunctivae and sclerae normal  HENT: ear canals and TM's normal, nose and mouth without ulcers or lesions  NECK: no adenopathy, no asymmetry, masses, or scars and thyroid normal to " palpation  RESP: lungs clear to auscultation - no rales, rhonchi or wheezes  CV: regular rate and rhythm, normal S1 S2, no S3 or S4, no murmur, click or rub, no peripheral edema and peripheral pulses strong  ABDOMEN: soft, nontender, no hepatosplenomegaly, no masses and bowel sounds normal  MS: no gross musculoskeletal defects noted, no edema  SKIN: no suspicious lesions or rashes  NEURO: Normal strength and tone, mentation intact and speech normal  PSYCH: mentation appears normal, affect normal/bright  Cervical, thoracic and lumbar spine exam is normal without tenderness, masses or kyphoscoliosis. Full range of motion with  Pain on low back  is noted.  Normal strength, reflex and sensation on legs. Raise leg test negative bilateral 90 degree.     Diagnostic Test Results:  Labs reviewed in Epic    ASSESSMENT/PLAN:   (Z00.00) Routine general medical examination at a health care facility  (primary encounter diagnosis)  Comment: advise tdap and covid vaccine up to date. He declined. Advise std gcc screening test and he declined.   Plan: Lipid panel, Comprehensive metabolic panel (BMP        + Alb, Alk Phos, ALT, AST, Total. Bili, TP),         HIV Antigen Antibody Combo, Hepatitis C         antibody            (M54.42,  M54.41,  G89.29) Chronic bilateral low back pain with bilateral sciatica  Comment: low back pain x one year and radiated to bilateral legs. Bend trigger the pain. Pain started one year ago when he squat.  No fever, leg weakness. Good bladder and bm control. Likely he has low back pain with bilateral sciatica, will have mri to rule out disc herniate.   Plan: MR Lumbar Spine w/o Contrast        Advise to avoid bend. Back stretch exercise as tolerated. Advise physical therapy and he declined.       Patient has been advised of split billing requirements and indicates understanding: Yes    COUNSELING:   Reviewed preventive health counseling, as reflected in patient instructions       Regular exercise        "Healthy diet/nutrition       Vision screening       Safe sex practices/STD prevention       Consider Hep C screening for all patients one time for ages 18-79 years       HIV screeninx in teen years, 1x in adult years, and at intervals if high risk       Advance Care Planning    Estimated body mass index is 25.36 kg/m  as calculated from the following:    Height as of this encounter: 1.91 m (6' 3.2\").    Weight as of this encounter: 92.5 kg (204 lb).     Weight management plan: Discussed healthy diet and exercise guidelines    He reports that he has never smoked. He has never used smokeless tobacco.      Counseling Resources:  ATP IV Guidelines  Pooled Cohorts Equation Calculator  FRAX Risk Assessment  ICSI Preventive Guidelines  Dietary Guidelines for Americans, 2010  USDA's MyPlate  ASA Prophylaxis  Lung CA Screening    Keyla Ibrahim MD  [facility]  "

## 2022-03-14 LAB — HCV AB SERPL QL IA: NEGATIVE

## 2022-03-16 ENCOUNTER — NURSE TRIAGE (OUTPATIENT)
Dept: NURSING | Facility: CLINIC | Age: 22
End: 2022-03-16
Payer: COMMERCIAL

## 2022-03-16 ENCOUNTER — NURSE TRIAGE (OUTPATIENT)
Dept: FAMILY MEDICINE | Facility: CLINIC | Age: 22
End: 2022-03-16
Payer: COMMERCIAL

## 2022-03-16 ENCOUNTER — TELEPHONE (OUTPATIENT)
Dept: FAMILY MEDICINE | Facility: CLINIC | Age: 22
End: 2022-03-16
Payer: COMMERCIAL

## 2022-03-16 DIAGNOSIS — M54.41 CHRONIC BILATERAL LOW BACK PAIN WITH BILATERAL SCIATICA: Primary | ICD-10-CM

## 2022-03-16 DIAGNOSIS — M54.42 CHRONIC BILATERAL LOW BACK PAIN WITH BILATERAL SCIATICA: Primary | ICD-10-CM

## 2022-03-16 DIAGNOSIS — G89.29 CHRONIC BILATERAL LOW BACK PAIN WITH BILATERAL SCIATICA: Primary | ICD-10-CM

## 2022-03-16 RX ORDER — PREDNISONE 20 MG/1
40 TABLET ORAL DAILY
Qty: 10 TABLET | Refills: 0 | Status: SHIPPED | OUTPATIENT
Start: 2022-03-16 | End: 2022-03-21

## 2022-03-16 NOTE — TELEPHONE ENCOUNTER
Please inform pt that he needs to see spine ortho for the steroid injection. I did spine ortho referral and the office will call him for the appointment.     I ordered 5 days of oral steroid that will help with the sciatic nerve pain. He needs to see provider for the further evaluation and treatment as soon as possible.    Keyla Ibrahim MD on 3/16/2022 at 10:26 AM

## 2022-03-16 NOTE — TELEPHONE ENCOUNTER
Forms Request  Name of form/paperwork: [employer]  Have you been seen for this request: N/A  Do we have the form: yes, in providers inbox  When is form needed by: requested by 3/18  How would you like the form returned: fax  Patient Notified form requests are processed in 3-5 business days: n/a    Okay to leave a detailed message? n/a

## 2022-03-16 NOTE — TELEPHONE ENCOUNTER
Will have Medical record fax the progress note at 3/11/2022 visit to them.     Keyla Ibrahim MD on 3/16/2022 at 3:02 PM

## 2022-03-16 NOTE — TELEPHONE ENCOUNTER
"Patient is getting an MRI for back pain.  Now for the past couple of days is having pain going down his legs.  Back is hurting patient today.  Patient feels it is sciatica pain.  Today pain is currently an \"8\".  Patient does not want to go to ED.  Patient is requesting a message be sent to Keyla Hernandez.  Patient is wanting to know what MD would recommend.  Is there any pain management a steroid injection or pain medication.  Patient is requesting a call back today from Keyla Hernandez.  Please phone patient at [phone].      Nurse Triage SBAR    Is this a 2nd Level Triage? YES, LICENSED PRACTITIONER REVIEW IS REQUIRED    Situation: Patient is calling with back pain and has an upcoming MRI.    Background: Patient has been seen for his back pain by Keyla Hernandez.    Assessment: Pain is radiating down both legs and MD is aware of this.  Patient is requesting guidance on how to treat pain.  Patient is wondering about a steroid injection or pain medication.  Patient is wanting to know what the next steps are.  Patient is requesting a call back.     Protocol Recommended Disposition:   See Within 3 Days In Office     Recommendation: See PCP within 3 days.     Routed to provider    Does the patient meet one of the following criteria for ADS visit consideration? No    COVID 19 Nurse Triage Plan/Patient Instructions    Please be aware that novel coronavirus (COVID-19) may be circulating in the community. If you develop symptoms such as fever, cough, or SOB or if you have concerns about the presence of another infection including coronavirus (COVID-19), please contact your health care provider or visit [email or web address].     Disposition/Instructions    In-Person Visit with provider recommended. Reference Visit Selection Guide.    Thank you for taking steps to prevent the spread of this virus.  o Limit your contact with others.  o Wear a simple mask to cover your cough.  o Wash your hands well and " often.    Resources    [facility]: About COVID-19: [email or web address]    CDC: What to Do If You're Sick: www.cdc.gov/coronavirus/2019-ncov/about/steps-when-sick.html    CDC: Ending Home Isolation: www.cdc.gov/coronavirus/2019-ncov/hcp/disposition-in-home-patients.html     CDC: Caring for Someone: www.cdc.gov/coronavirus/2019-ncov/if-you-are-sick/care-for-someone.html     [facility]: Interim Guidance for Hospital Discharge to Home: www.health.[facility].[place]./diseases/coronavirus/hcp/hospdischarge.pdf    [facility] clinical trials (COVID-19 research studies): [email or web address]-trials     Below are the COVID-19 hotlines at the [place] Department of Health ([facility]). Interpreters are available.   o For health questions: Call 197-018-5190 or 1-172.282.6526 (7 a.m. to 7 p.m.)  o For questions about schools and childcare: Call 522-690-3597 or 1-134.310.9573 (7 a.m. to 7 p.m.)                         Reason for Disposition    MODERATE back pain (e.g., interferes with normal activities) and present > 3 days    Additional Information    Negative: Passed out (i.e., fainted, collapsed and was not responding)    Negative: Shock suspected (e.g., cold/pale/clammy skin, too weak to stand, low BP, rapid pulse)    Negative: Sounds like a life-threatening emergency to the triager    Negative: SEVERE back pain of sudden onset and age > 60    Negative: SEVERE abdominal pain (e.g., excruciating)    Negative: Abdominal pain and age > 60    Negative: Unable to urinate (or only a few drops) and bladder feels very full    Negative: Loss of bladder or bowel control (urine or bowel incontinence; wetting self, leaking stool) of new onset    Negative: Numbness (loss of sensation) in groin or rectal area    Negative: Pain radiates into groin, scrotum    Negative: Blood in urine (red, pink, or tea-colored)    Negative: Vomiting and pain over lower ribs of back (i.e., flank - kidney area)    Negative:  Weakness of a leg or foot (e.g., unable to bear weight, dragging foot)    Negative: Patient sounds very sick or weak to the triager    Negative: Fever > 100.4 F (38.0 C) and flank pain    Negative: Pain or burning with passing urine (urination)    Negative: SEVERE back pain (e.g., excruciating, unable to do any normal activities) and not improved after pain medicine and CARE ADVICE    Negative: Numbness in an arm or hand (i.e., loss of sensation) and upper back pain    Negative: Numbness in a leg or foot (i.e., loss of sensation)    Negative: High-risk adult (e.g., history of cancer, history of HIV, or history of IV drug abuse)    Negative: Painful rash with multiple small blisters grouped together (i.e., dermatomal distribution or 'band' or 'stripe')    Negative: Pain radiates into the thigh or further down the leg, and in both legs    Negative: Age > 50 and no history of prior similar back pain    Protocols used: BACK PAIN-A-OH

## 2022-03-17 ENCOUNTER — TELEPHONE (OUTPATIENT)
Dept: FAMILY MEDICINE | Facility: CLINIC | Age: 22
End: 2022-03-17
Payer: COMMERCIAL

## 2022-03-17 NOTE — TELEPHONE ENCOUNTER
Last visit and labs faxed to [facility] @ [phone].    Priya Sweeney MA on 3/17/2022 at 9:30 AM

## 2022-03-17 NOTE — TELEPHONE ENCOUNTER
Forms Request  Name of form/paperwork: [facility]  Have you been seen for this request: n/a   Do we have the form: yes   When is form needed by: when completed   How would you like the form returned: fax  Fax: [phone]  Patient Notified form requests are processed in 3-5 business days: n/a  Okay to leave a detailed message? N/a    Fax cover sheet stating pt name does not match records sent. Reviewing records pt name and records do match.

## 2022-03-18 NOTE — TELEPHONE ENCOUNTER
Form states that pt info does not match. I did verifiy that everything matches with what they have sent us and it does. Returned fax with note stating so.

## 2022-03-22 ENCOUNTER — TELEPHONE (OUTPATIENT)
Dept: FAMILY MEDICINE | Facility: CLINIC | Age: 22
End: 2022-03-22
Payer: COMMERCIAL

## 2022-03-22 ENCOUNTER — MYC MEDICAL ADVICE (OUTPATIENT)
Dept: FAMILY MEDICINE | Facility: CLINIC | Age: 22
End: 2022-03-22
Payer: COMMERCIAL

## 2022-03-22 NOTE — TELEPHONE ENCOUNTER
Reason for Call:  Other FYI on denial     Detailed comments: [facility] - [phone]  [facility] in regards of MR LUMBAR SPINE W/O CONTRAST   Details - Denied due to lack of 6weeks provider directed treatment within the last 3 months, with a follow up visit after the treatment has been completed.   It was denied because of that - did have office notes from us from his office visit. He has been seeing a chiropractor since July. If the provider would like to do a peer to peer to insurance company, may be approved.  [facility] peer information:   [phone]  Case #[id number]    Pt cancelled appointment - any questions, feel free to call. Once approved, can get the patient scheduled again.     Phone Number Patient can be reached at: Home number on file [phone] (home)    Best Time: any    Can we leave a detailed message on this number? YES    Call taken on 3/22/2022 at 2:28 PM by Sergio White

## 2022-03-22 NOTE — TELEPHONE ENCOUNTER
Reason for Call:  Other     Detailed comments: Mri of l-spine  was denied by his insurance  pt is asking what are the next steps to get this authorized ?    Phone Number Patient can be reached at: Cell number on file:    Telephone Information:   Mobile [phone]       Best Time:anytime    Can we leave a detailed message on this number? YES    Call taken on 3/22/2022 at 2:23 PM by Cristy Bronson

## 2022-03-23 NOTE — TELEPHONE ENCOUNTER
Pt calling to check status, states he is aware that it was denied but feel like full story has been told as the insurance is asking for other options prior to approval.  He feels he has done last year when he did 6-8 visits with chiropractor and low activity. He has faxed this info to us today and hopes Dr Ibrahim will look at.    Would like us to do peer to peer review to try and get this approved

## 2022-06-07 NOTE — TELEPHONE ENCOUNTER
Spoke to patient.  Agreed to one-time write off of outstanding balance due to a variety of circumstances.

## 2022-09-17 ENCOUNTER — HEALTH MAINTENANCE LETTER (OUTPATIENT)
Age: 22
End: 2022-09-17

## 2022-09-20 ENCOUNTER — APPOINTMENT (OUTPATIENT)
Dept: URBAN - METROPOLITAN AREA CLINIC 260 | Age: 22
Setting detail: DERMATOLOGY
End: 2022-09-21

## 2022-09-20 VITALS — HEIGHT: 76 IN | WEIGHT: 200 LBS

## 2022-09-20 DIAGNOSIS — L64.8 OTHER ANDROGENIC ALOPECIA: ICD-10-CM

## 2022-09-20 PROCEDURE — 99203 OFFICE O/P NEW LOW 30 MIN: CPT

## 2022-09-20 PROCEDURE — OTHER ADDITIONAL NOTES: OTHER

## 2022-09-20 PROCEDURE — OTHER PRESCRIPTION MEDICATION MANAGEMENT: OTHER

## 2022-09-20 PROCEDURE — OTHER MIPS QUALITY: OTHER

## 2022-09-20 PROCEDURE — OTHER PRESCRIPTION: OTHER

## 2022-09-20 PROCEDURE — OTHER COUNSELING: OTHER

## 2022-09-20 RX ORDER — FINASTERIDE 1 MG/1
1MG TABLET, FILM COATED ORAL QD
Qty: 30 | Refills: 2 | Status: ERX | COMMUNITY
Start: 2022-09-20

## 2022-09-20 ASSESSMENT — LOCATION DETAILED DESCRIPTION DERM: LOCATION DETAILED: LEFT SUPERIOR PARIETAL SCALP

## 2022-09-20 ASSESSMENT — LOCATION ZONE DERM: LOCATION ZONE: SCALP

## 2022-09-20 ASSESSMENT — LOCATION SIMPLE DESCRIPTION DERM: LOCATION SIMPLE: SCALP

## 2022-09-20 NOTE — PROCEDURE: ADDITIONAL NOTES
Render Risk Assessment In Note?: no
Additional Notes: Recommend taking biotin daily.\\nRecommend OTC Minoxidil 5% solution.
Detail Level: Simple

## 2022-09-20 NOTE — PROCEDURE: PRESCRIPTION MEDICATION MANAGEMENT
Render In Strict Bullet Format?: No
Plan: Follow up in 90 days for recheck.\\nWill prescribe 90 days supplies if everything going well.
Detail Level: Simple
Initiate Treatment: Finasteride 1mg 1 tablet daily

## 2022-09-20 NOTE — HPI: HAIR LOSS
Previous Labs: No
How Did The Hair Loss Occur?: gradual in onset
How Severe Is Your Hair Loss?: mild
Additional History: He is concerned about thinning and receding temples as well as occipital thinning due to his young age.  He would like to prevent as much hair loss as possible.

## 2023-05-06 ENCOUNTER — HEALTH MAINTENANCE LETTER (OUTPATIENT)
Age: 23
End: 2023-05-06

## 2023-06-20 ENCOUNTER — OFFICE VISIT (OUTPATIENT)
Dept: FAMILY MEDICINE | Facility: CLINIC | Age: 23
End: 2023-06-20
Payer: COMMERCIAL

## 2023-06-20 ENCOUNTER — MYC MEDICAL ADVICE (OUTPATIENT)
Dept: FAMILY MEDICINE | Facility: CLINIC | Age: 23
End: 2023-06-20

## 2023-06-20 VITALS
HEIGHT: 75 IN | OXYGEN SATURATION: 98 % | WEIGHT: 207.5 LBS | BODY MASS INDEX: 25.8 KG/M2 | RESPIRATION RATE: 16 BRPM | TEMPERATURE: 98.2 F | HEART RATE: 86 BPM | SYSTOLIC BLOOD PRESSURE: 126 MMHG | DIASTOLIC BLOOD PRESSURE: 82 MMHG

## 2023-06-20 DIAGNOSIS — Z71.89 ENCOUNTER FOR HIV SCREENING AND DISCUSSION OF PRE-EXPOSURE PROPHYLAXIS FOR HIV: Primary | ICD-10-CM

## 2023-06-20 DIAGNOSIS — J02.9 ACUTE PHARYNGITIS, UNSPECIFIED ETIOLOGY: ICD-10-CM

## 2023-06-20 DIAGNOSIS — J31.2 CHRONIC PHARYNGITIS: Primary | ICD-10-CM

## 2023-06-20 DIAGNOSIS — Z29.81 ENCOUNTER FOR HIV SCREENING AND DISCUSSION OF PRE-EXPOSURE PROPHYLAXIS FOR HIV: Primary | ICD-10-CM

## 2023-06-20 DIAGNOSIS — Z11.4 ENCOUNTER FOR HIV SCREENING AND DISCUSSION OF PRE-EXPOSURE PROPHYLAXIS FOR HIV: ICD-10-CM

## 2023-06-20 DIAGNOSIS — Z11.3 ROUTINE SCREENING FOR STI (SEXUALLY TRANSMITTED INFECTION): ICD-10-CM

## 2023-06-20 DIAGNOSIS — J35.1 TONSILLAR HYPERTROPHY: ICD-10-CM

## 2023-06-20 DIAGNOSIS — Z29.81 ENCOUNTER FOR HIV SCREENING AND DISCUSSION OF PRE-EXPOSURE PROPHYLAXIS FOR HIV: ICD-10-CM

## 2023-06-20 DIAGNOSIS — J03.01 ACUTE RECURRENT STREPTOCOCCAL TONSILLITIS: ICD-10-CM

## 2023-06-20 DIAGNOSIS — Z71.89 ENCOUNTER FOR HIV SCREENING AND DISCUSSION OF PRE-EXPOSURE PROPHYLAXIS FOR HIV: ICD-10-CM

## 2023-06-20 DIAGNOSIS — Z11.4 ENCOUNTER FOR HIV SCREENING AND DISCUSSION OF PRE-EXPOSURE PROPHYLAXIS FOR HIV: Primary | ICD-10-CM

## 2023-06-20 LAB
DEPRECATED S PYO AG THROAT QL EIA: NEGATIVE
GROUP A STREP BY PCR: NOT DETECTED
HIV 1+2 AB+HIV1 P24 AG SERPL QL IA: NONREACTIVE
T PALLIDUM AB SER QL: NONREACTIVE

## 2023-06-20 PROCEDURE — 80048 BASIC METABOLIC PNL TOTAL CA: CPT

## 2023-06-20 PROCEDURE — 86780 TREPONEMA PALLIDUM: CPT

## 2023-06-20 PROCEDURE — 86803 HEPATITIS C AB TEST: CPT

## 2023-06-20 PROCEDURE — 87389 HIV-1 AG W/HIV-1&-2 AB AG IA: CPT

## 2023-06-20 PROCEDURE — 87651 STREP A DNA AMP PROBE: CPT

## 2023-06-20 PROCEDURE — 36415 COLL VENOUS BLD VENIPUNCTURE: CPT

## 2023-06-20 PROCEDURE — 87491 CHLMYD TRACH DNA AMP PROBE: CPT

## 2023-06-20 PROCEDURE — 87591 N.GONORRHOEAE DNA AMP PROB: CPT

## 2023-06-20 PROCEDURE — 99213 OFFICE O/P EST LOW 20 MIN: CPT

## 2023-06-20 RX ORDER — LIDOCAINE HYDROCHLORIDE 20 MG/ML
15 SOLUTION OROPHARYNGEAL EVERY 6 HOURS PRN
Qty: 100 ML | Refills: 0 | Status: SHIPPED | OUTPATIENT
Start: 2023-06-20

## 2023-06-20 RX ORDER — FLUTICASONE PROPIONATE 50 MCG
1 SPRAY, SUSPENSION (ML) NASAL DAILY
Qty: 16 G | Refills: 0 | Status: SHIPPED | OUTPATIENT
Start: 2023-06-20

## 2023-06-20 NOTE — PROGRESS NOTES
Assessment & Plan   Problem List Items Addressed This Visit        Respiratory    Chronic pharyngitis - Primary     Recurrent infections since March of this year (four in total per patient report). Does go between [place] and [place], so some records are unavailable to me today. Rapid strep was negative in clinic today; will follow up on culture. Will rule out gonorrhea/chlamydia for throat symptoms. Had a positive mono spot which could be contributing. Has already been treated with oral steroids with recurrence of symptoms so deferred this today. Discussed that if STI panel is negative, ENT is the next logical step especially in the context of significant tonsillar hypertrophy. Was not able to secure an appointment until later this year, so updated referral was placed and patient was provided with affiliated clinics to see if they could accommodate sooner. Lidocaine and Flonase provided for symptom management in the meantime. Patient expressed an understanding of and comfort with this plan. All questions were answered.         Relevant Medications    lidocaine, viscous, (XYLOCAINE) 2 % solution    fluticasone (FLONASE) 50 MCG/ACT nasal spray    Other Relevant Orders    Streptococcus A Rapid Screen w/Reflex to PCR - Clinic Collect (Completed)    Group A Streptococcus PCR Throat Swab    Chlamydia trachomatis/Neisseria gonorrhoeae by PCR - Clinic Collect   Other Visit Diagnoses     Tonsillar hypertrophy        Relevant Orders    Adult ENT  Referral    Acute recurrent streptococcal tonsillitis        Relevant Orders    Adult ENT  Referral    Routine screening for STI (sexually transmitted infection)        Relevant Orders    HIV Antigen Antibody Combo    Treponema Abs w Reflex to RPR and Titer    Chlamydia trachomatis/Neisseria gonorrhoeae by PCR - Clinic Collect         DELORIS Harding [facility] SANDHYA Dahl is a 23 year old, presenting for the following  health issues:  Throat Pain (Strep 10 days ago )        6/20/2023    10:04 AM   Additional Questions   Roomed by ac   Accompanied by self     Patient reports multiple documented strep infections since March of this year.  He lives between [place] and [place], so some of these infections are not documented within our system, but patient is a good historian.  At one point he was also screened for mono and tested positive on a Monospot.  He reports that he was initially treated with amoxicillin, then had 2 rounds of Augmentin, and most recently was prescribed cefdinir which he finished this last Sunday.  He reports he feels as if he has symptoms return almost immediately upon stopping any antibiotics.  Denies any difficulty breathing, difficulty swallowing, foul taste in the mouth or foul-smelling breath. Red, swollen tonsils that feel irritated and will have throat pain by the end of the day. Some post nasal drip he attributes to allergies. Has tried anti inflammatories which help. Also took decadron and this temporarily helped as well. No known exposures to sexually transmitted infections but endorses approximately 3 male sexual partners since February.      History of Present Illness       Reason for visit:  Throat  Symptom onset:  More than a month  Symptoms include:  Sore throat  Symptom intensity:  Mild  Symptom progression:  Staying the same  Had these symptoms before:  Yes  Has tried/received treatment for these symptoms:  Yes  Previous treatment was successful:  No  What makes it worse:  Talking  What makes it better:  Anti inflamatiries    He eats 2-3 servings of fruits and vegetables daily.He consumes 2 sweetened beverage(s) daily.He exercises with enough effort to increase his heart rate 30 to 60 minutes per day.  He exercises with enough effort to increase his heart rate 5 days per week.   He is taking medications regularly.     Review of Systems         Objective    /82 (BP Location: Left arm,  "Patient Position: Sitting, Cuff Size: Adult Large)   Pulse 86   Temp 98.2  F (36.8  C) (Oral)   Resp 16   Ht 1.91 m (6' 3.2\")   Wt 94.1 kg (207 lb 8 oz)   SpO2 98%   BMI 25.80 kg/m    Body mass index is 25.8 kg/m .     Physical Exam  Vitals and nursing note reviewed.   Constitutional:       Appearance: Normal appearance. He is not ill-appearing.   HENT:      Head: Normocephalic and atraumatic.      Nose: No congestion.      Mouth/Throat:      Mouth: Mucous membranes are moist.      Dentition: Normal dentition.      Pharynx: Posterior oropharyngeal erythema present.      Tonsils: No tonsillar exudate or tonsillar abscesses. 3+ on the right. 3+ on the left.      Comments: No tonsillar stones  Neurological:      Mental Status: He is alert.        Results for orders placed or performed in visit on 06/20/23 (from the past 24 hour(s))   Streptococcus A Rapid Screen w/Reflex to PCR - Clinic Collect    Specimen: Throat; Swab   Result Value Ref Range    Group A Strep antigen Negative Negative               "

## 2023-06-20 NOTE — PATIENT INSTRUCTIONS
[facility]  [facility]  [facility]  [facility]  [facility]  [facility]  Primary ENT  [facility]  [facility]

## 2023-06-20 NOTE — ASSESSMENT & PLAN NOTE
Recurrent infections since March of this year (four in total per patient report). [place] between [place] and [place], so some records are unavailable to me today. Rapid strep was negative in clinic today; will follow up on culture. Will rule out gonorrhea/chlamydia for throat symptoms. Had a positive mono spot which could be contributing. Has already been treated with oral steroids with recurrence of symptoms so deferred this today. Discussed that if STI panel is negative, ENT is the next logical step especially in the context of significant tonsillar hypertrophy. Was not able to secure an appointment until later this year, so updated referral was placed and patient was provided with affiliated clinics to see if they could accommodate sooner. Lidocaine and Flonase provided for symptom management in the meantime. Patient expressed an understanding of and comfort with this plan. All questions were answered.

## 2023-06-21 LAB
ANION GAP SERPL CALCULATED.3IONS-SCNC: 17 MMOL/L (ref 7–15)
BUN SERPL-MCNC: 17 MG/DL (ref 6–20)
C TRACH DNA SPEC QL PROBE+SIG AMP: NEGATIVE
CALCIUM SERPL-MCNC: 9.7 MG/DL (ref 8.6–10)
CHLORIDE SERPL-SCNC: 101 MMOL/L (ref 98–107)
CREAT SERPL-MCNC: 1.2 MG/DL (ref 0.67–1.17)
DEPRECATED HCO3 PLAS-SCNC: 24 MMOL/L (ref 22–29)
GFR SERPL CREATININE-BSD FRML MDRD: 87 ML/MIN/1.73M2
GLUCOSE SERPL-MCNC: 83 MG/DL (ref 70–99)
HCV AB SERPL QL IA: NONREACTIVE
N GONORRHOEA DNA SPEC QL NAA+PROBE: NEGATIVE
POTASSIUM SERPL-SCNC: 5.2 MMOL/L (ref 3.4–5.3)
SODIUM SERPL-SCNC: 142 MMOL/L (ref 136–145)

## 2023-06-22 ENCOUNTER — TELEPHONE (OUTPATIENT)
Dept: FAMILY MEDICINE | Facility: CLINIC | Age: 23
End: 2023-06-22
Payer: COMMERCIAL

## 2023-06-22 NOTE — TELEPHONE ENCOUNTER
----- Message from DELORIS Harding CNP sent at 6/22/2023 12:44 PM CDT -----  Please call patient and have him schedule a telephone or video visit with me to discuss PreP. Can use approval req slots or double book if necessary. Thanks!   DELORIS Harding CNP on 6/22/2023 at 12:44 PM

## 2023-06-23 ENCOUNTER — VIRTUAL VISIT (OUTPATIENT)
Dept: FAMILY MEDICINE | Facility: CLINIC | Age: 23
End: 2023-06-23
Payer: COMMERCIAL

## 2023-06-23 DIAGNOSIS — Z29.81 ENCOUNTER FOR COUNSELING BEFORE STARTING AND ABOUT PRE-EXPOSURE PROPHYLAXIS FOR HIV: Primary | ICD-10-CM

## 2023-06-23 DIAGNOSIS — Z71.89 ENCOUNTER FOR COUNSELING BEFORE STARTING AND ABOUT PRE-EXPOSURE PROPHYLAXIS FOR HIV: Primary | ICD-10-CM

## 2023-06-23 PROCEDURE — 99213 OFFICE O/P EST LOW 20 MIN: CPT | Mod: VID

## 2023-06-23 RX ORDER — EMTRICITABINE AND TENOFOVIR DISOPROXIL FUMARATE 200; 300 MG/1; MG/1
1 TABLET, FILM COATED ORAL DAILY
Qty: 90 TABLET | Refills: 3 | Status: SHIPPED | OUTPATIENT
Start: 2023-06-23

## 2023-06-23 NOTE — ASSESSMENT & PLAN NOTE
Patient meets criteria for preexposure prophylaxis.  Will prescribe Truvada.  We discussed the expected therapeutic effects, including upwards of 99% efficacy in preventing HIV acquisition, and potential side effects including nausea and diarrhea in the short-term.  We also discussed that long-term use of this medication has the potential to affect to both kidney function and bone health.  Discussed proper administration of the medication, pacifically consistent, daily dosing.  Patient is aware that this medication requires HIV testing approximately every 3 months when sexually active.  Additionally, would like him to have his kidney function rechecked in 1 year due to this mild elevation.  Encouraged condom use to prevent other sexually-transmitted infections.  Patient to follow-up in 1 year for continued prescriptions.

## 2023-06-23 NOTE — PROGRESS NOTES
Hesham is a 23 year old who is being evaluated via a billable video visit.      How would you like to obtain your AVS? MyChart  If the video visit is dropped, the invitation should be resent by: Send to e-mail at: [email or web address]  Will anyone else be joining your video visit? No    Assessment & Plan   Problem List Items Addressed This Visit        Other    Encounter for counseling before starting and about pre-exposure prophylaxis for HIV - Primary     Patient meets criteria for preexposure prophylaxis.  Will prescribe Truvada.  We discussed the expected therapeutic effects, including upwards of 99% efficacy in preventing HIV acquisition, and potential side effects including nausea and diarrhea in the short-term.  We also discussed that long-term use of this medication has the potential to affect to both kidney function and bone health.  Discussed proper administration of the medication, pacifically consistent, daily dosing.  Patient is aware that this medication requires HIV testing approximately every 3 months when sexually active.  Additionally, would like him to have his kidney function rechecked in 1 year due to this mild elevation.  Encouraged condom use to prevent other sexually-transmitted infections.  Patient to follow-up in 1 year for continued prescriptions.         Relevant Medications    emtricitabine-tenofovir (TRUVADA) 200-300 MG per tablet      DELORIS Harding [facility]   Hesham is a 23 year old, presenting for the following health issues:  Discuss PreP        6/23/2023     1:18 PM   Additional Questions   Roomed by xl     Would like to discuss PReP  Had lab work completed this last week, which was negative for any sexually-transmitted infection.  He also had kidney function done, which showed a slight elevation in his creatinine, with all other kidney function test including his GFR within normal limits.  He does report that he had worked out this  morning and had been outside in the heat, so likely an aspect of dehydration.  He knows the basics regarding prep.  Engages in intermittent anal intercourse with men. No known exposures to HIV or symptoms (in addition to negative testing)    Review of Systems         Objective       Vitals:  No vitals were obtained today due to virtual visit.    Physical Exam   GENERAL: Healthy, alert and no distress  EYES: Eyes grossly normal to inspection.  No discharge or erythema, or obvious scleral/conjunctival abnormalities.  RESP: No audible wheeze, cough, or visible cyanosis.  No visible retractions or increased work of breathing.    SKIN: Visible skin clear. No significant rash, abnormal pigmentation or lesions.  NEURO: Cranial nerves grossly intact.  Mentation and speech appropriate for age.  PSYCH: Mentation appears normal, affect normal/bright, judgement and insight intact, normal speech and appearance well-groomed.    Recent Results (from the past 168 hour(s))   Streptococcus A Rapid Screen w/Reflex to PCR - Clinic Collect    Specimen: Throat; Swab   Result Value Ref Range    Group A Strep antigen Negative Negative   Group A Streptococcus PCR Throat Swab    Specimen: Throat; Swab   Result Value Ref Range    Group A strep by PCR Not Detected Not Detected   Chlamydia trachomatis/Neisseria gonorrhoeae by PCR - Clinic Collect    Specimen: Throat; Swab   Result Value Ref Range    Chlamydia Trachomatis Negative Negative    Neisseria gonorrhoeae Negative Negative   HIV Antigen Antibody Combo   Result Value Ref Range    HIV Antigen Antibody Combo Nonreactive Nonreactive   Treponema Abs w Reflex to RPR and Titer   Result Value Ref Range    Treponema Antibody Total Nonreactive Nonreactive   Basic metabolic panel  (Ca, Cl, CO2, Creat, Gluc, K, Na, BUN)   Result Value Ref Range    Sodium 142 136 - 145 mmol/L    Potassium 5.2 3.4 - 5.3 mmol/L    Chloride 101 98 - 107 mmol/L    Carbon Dioxide (CO2) 24 22 - 29 mmol/L    Anion Gap 17  (H) 7 - 15 mmol/L    Urea Nitrogen 17.0 6.0 - 20.0 mg/dL    Creatinine 1.20 (H) 0.67 - 1.17 mg/dL    Calcium 9.7 8.6 - 10.0 mg/dL    Glucose 83 70 - 99 mg/dL    GFR Estimate 87 >60 mL/min/1.73m2   Hepatitis C Screen Reflex to HCV RNA Quant and Genotype   Result Value Ref Range    Hepatitis C Antibody Nonreactive Nonreactive           Video-Visit Details    Type of service:  Video Visit     Originating Location (pt. Location): Home  Distant Location (provider location):  On-site  Platform used for Video Visit: Jaspal

## 2023-06-23 NOTE — PATIENT INSTRUCTIONS
The CDC has an excellent resource regarding pre-exposure prophylaxis found here: https://www.cdc.gov/hiv/risk/prep/index.html    Make sure you are getting HIV tests every 3 months while on this medication if sexually active  Condom use to prevent other STIs  I would recommend having your kidney function rechecked in a year due to the mild elevation in one of the components. Make sure you are hydrating and avoiding excessive doses of other medications that can affect your kidneys such as ibuprofen  Follow up in one year for additional prescriptions

## 2023-06-26 RX ORDER — PREDNISONE 20 MG/1
20 TABLET ORAL DAILY
Qty: 4 TABLET | Refills: 0 | Status: SHIPPED | OUTPATIENT
Start: 2023-06-26 | End: 2023-06-30

## 2024-07-13 ENCOUNTER — HEALTH MAINTENANCE LETTER (OUTPATIENT)
Age: 24
End: 2024-07-13

## 2025-07-19 ENCOUNTER — HEALTH MAINTENANCE LETTER (OUTPATIENT)
Age: 25
End: 2025-07-19